# Patient Record
Sex: FEMALE | Race: OTHER | NOT HISPANIC OR LATINO | URBAN - METROPOLITAN AREA
[De-identification: names, ages, dates, MRNs, and addresses within clinical notes are randomized per-mention and may not be internally consistent; named-entity substitution may affect disease eponyms.]

---

## 2017-01-13 ENCOUNTER — EMERGENCY (EMERGENCY)
Facility: HOSPITAL | Age: 52
LOS: 1 days | Discharge: HOME | End: 2017-01-13
Attending: EMERGENCY MEDICINE | Admitting: EMERGENCY MEDICINE
Payer: COMMERCIAL

## 2017-01-13 VITALS
HEART RATE: 96 BPM | OXYGEN SATURATION: 99 % | SYSTOLIC BLOOD PRESSURE: 114 MMHG | RESPIRATION RATE: 16 BRPM | DIASTOLIC BLOOD PRESSURE: 80 MMHG | TEMPERATURE: 97 F

## 2017-01-13 DIAGNOSIS — R22.0 LOCALIZED SWELLING, MASS AND LUMP, HEAD: ICD-10-CM

## 2017-01-13 DIAGNOSIS — L03.211 CELLULITIS OF FACE: ICD-10-CM

## 2017-01-13 DIAGNOSIS — Z91.013 ALLERGY TO SEAFOOD: ICD-10-CM

## 2017-01-13 DIAGNOSIS — Z79.2 LONG TERM (CURRENT) USE OF ANTIBIOTICS: ICD-10-CM

## 2017-01-13 DIAGNOSIS — Z88.0 ALLERGY STATUS TO PENICILLIN: ICD-10-CM

## 2017-01-13 LAB
ALBUMIN SERPL ELPH-MCNC: 3.7 G/DL — SIGNIFICANT CHANGE UP (ref 3.4–5)
ALP SERPL-CCNC: 68 U/L — SIGNIFICANT CHANGE UP (ref 40–120)
ALT FLD-CCNC: 9 U/L — LOW (ref 12–42)
ANION GAP SERPL CALC-SCNC: 9 MMOL/L — SIGNIFICANT CHANGE UP (ref 9–16)
APTT BLD: 31.5 SEC — SIGNIFICANT CHANGE UP (ref 27.5–36.5)
AST SERPL-CCNC: 31 U/L — SIGNIFICANT CHANGE UP (ref 15–37)
BILIRUB SERPL-MCNC: 0.5 MG/DL — SIGNIFICANT CHANGE UP (ref 0.2–1.2)
BUN SERPL-MCNC: 13 MG/DL — SIGNIFICANT CHANGE UP (ref 7–23)
CALCIUM SERPL-MCNC: 9 MG/DL — SIGNIFICANT CHANGE UP (ref 8.5–10.5)
CHLORIDE SERPL-SCNC: 103 MMOL/L — SIGNIFICANT CHANGE UP (ref 96–108)
CO2 SERPL-SCNC: 26 MMOL/L — SIGNIFICANT CHANGE UP (ref 22–31)
CREAT SERPL-MCNC: 0.65 MG/DL — SIGNIFICANT CHANGE UP (ref 0.5–1.3)
GLUCOSE SERPL-MCNC: 88 MG/DL — SIGNIFICANT CHANGE UP (ref 70–99)
HCG SERPL-ACNC: 3 MIU/ML — SIGNIFICANT CHANGE UP
HCT VFR BLD CALC: 40.6 % — SIGNIFICANT CHANGE UP (ref 34.5–45)
HGB BLD-MCNC: 14 G/DL — SIGNIFICANT CHANGE UP (ref 11.5–15.5)
INR BLD: 1.04 — SIGNIFICANT CHANGE UP (ref 0.88–1.16)
LACTATE SERPL-SCNC: 0.9 MMOL/L — SIGNIFICANT CHANGE UP (ref 0.4–2)
MCHC RBC-ENTMCNC: 31.5 PG — SIGNIFICANT CHANGE UP (ref 27–34)
MCHC RBC-ENTMCNC: 34.5 G/DL — SIGNIFICANT CHANGE UP (ref 32–36)
MCV RBC AUTO: 91.2 FL — SIGNIFICANT CHANGE UP (ref 80–100)
PLATELET # BLD AUTO: 199 K/UL — SIGNIFICANT CHANGE UP (ref 150–400)
POTASSIUM SERPL-MCNC: 5.1 MMOL/L — SIGNIFICANT CHANGE UP (ref 3.5–5.3)
POTASSIUM SERPL-SCNC: 5.1 MMOL/L — SIGNIFICANT CHANGE UP (ref 3.5–5.3)
PROT SERPL-MCNC: 7.6 G/DL — SIGNIFICANT CHANGE UP (ref 6.4–8.2)
PROTHROM AB SERPL-ACNC: 11.4 SEC — SIGNIFICANT CHANGE UP (ref 10–13.1)
RBC # BLD: 4.45 M/UL — SIGNIFICANT CHANGE UP (ref 3.8–5.2)
RBC # FLD: 11.9 % — SIGNIFICANT CHANGE UP (ref 10.3–16.9)
SODIUM SERPL-SCNC: 138 MMOL/L — SIGNIFICANT CHANGE UP (ref 132–145)
WBC # BLD: 13.6 K/UL — HIGH (ref 3.8–10.5)
WBC # FLD AUTO: 13.6 K/UL — HIGH (ref 3.8–10.5)

## 2017-01-13 PROCEDURE — 70487 CT MAXILLOFACIAL W/DYE: CPT | Mod: 26

## 2017-01-13 PROCEDURE — 99284 EMERGENCY DEPT VISIT MOD MDM: CPT

## 2017-01-13 RX ORDER — SODIUM CHLORIDE 9 MG/ML
1000 INJECTION INTRAMUSCULAR; INTRAVENOUS; SUBCUTANEOUS ONCE
Qty: 0 | Refills: 0 | Status: COMPLETED | OUTPATIENT
Start: 2017-01-13 | End: 2017-01-13

## 2017-01-13 RX ORDER — CEPHALEXIN 500 MG
1 CAPSULE ORAL
Qty: 40 | Refills: 0 | OUTPATIENT
Start: 2017-01-13 | End: 2017-01-23

## 2017-01-13 RX ADMIN — Medication 100 MILLIGRAM(S): at 18:05

## 2017-01-13 RX ADMIN — SODIUM CHLORIDE 1000 MILLILITER(S): 9 INJECTION INTRAMUSCULAR; INTRAVENOUS; SUBCUTANEOUS at 18:05

## 2017-01-13 NOTE — ED PROVIDER NOTE - OBJECTIVE STATEMENT
50 yo F w/ no pertinent PMHx c/o right cheek swelling s/p liposuction about x1 week ago. Pt states she had an cosmetic procedure last Thursday in [place], liposuction of both cheeks. Notes that she took abx and on Monday, and felt a small bump on her right cheek. Got put on new abx, Keflex, and overnight her bump got much bigger.

## 2017-01-13 NOTE — ED PROVIDER NOTE - MEDICAL DECISION MAKING DETAILS
spoke with Dr Stringer of plastics, Rx clinda 300 q 8 h in addition to Keflex and he can fokllow up in office, small collections not large enough to incise on CT scan

## 2017-01-13 NOTE — ED ADULT TRIAGE NOTE - CHIEF COMPLAINT QUOTE
sent from urgent care for right facial swelling s/p liposuction about 1 week ago, now on antibiotic, keflex

## 2017-01-13 NOTE — ED PROVIDER NOTE - NS ED MD SCRIBE ATTENDING SCRIBE SECTIONS
PAST MEDICAL/SURGICAL/SOCIAL HISTORY/DISPOSITION/OBSERVATION MONITORING PLAN/PHYSICAL EXAM/REVIEW OF SYSTEMS/HISTORY OF PRESENT ILLNESS

## 2017-01-14 ENCOUNTER — INPATIENT (INPATIENT)
Facility: HOSPITAL | Age: 52
LOS: 1 days | Discharge: HOME | DRG: 863 | End: 2017-01-16
Attending: STUDENT IN AN ORGANIZED HEALTH CARE EDUCATION/TRAINING PROGRAM | Admitting: STUDENT IN AN ORGANIZED HEALTH CARE EDUCATION/TRAINING PROGRAM
Payer: COMMERCIAL

## 2017-01-14 VITALS
TEMPERATURE: 99 F | OXYGEN SATURATION: 96 % | WEIGHT: 125 LBS | HEART RATE: 96 BPM | SYSTOLIC BLOOD PRESSURE: 100 MMHG | HEIGHT: 67 IN | DIASTOLIC BLOOD PRESSURE: 69 MMHG | RESPIRATION RATE: 18 BRPM

## 2017-01-14 LAB
ALBUMIN SERPL ELPH-MCNC: 3.3 G/DL — LOW (ref 3.4–5)
ALP SERPL-CCNC: 59 U/L — SIGNIFICANT CHANGE UP (ref 40–120)
ALT FLD-CCNC: 23 U/L — SIGNIFICANT CHANGE UP (ref 12–42)
ANION GAP SERPL CALC-SCNC: 7 MMOL/L — LOW (ref 9–16)
AST SERPL-CCNC: 15 U/L — SIGNIFICANT CHANGE UP (ref 15–37)
BASOPHILS NFR BLD AUTO: 0.2 % — SIGNIFICANT CHANGE UP (ref 0–2)
BILIRUB SERPL-MCNC: 0.4 MG/DL — SIGNIFICANT CHANGE UP (ref 0.2–1.2)
BUN SERPL-MCNC: 7 MG/DL — SIGNIFICANT CHANGE UP (ref 7–23)
CALCIUM SERPL-MCNC: 9.1 MG/DL — SIGNIFICANT CHANGE UP (ref 8.5–10.5)
CHLORIDE SERPL-SCNC: 103 MMOL/L — SIGNIFICANT CHANGE UP (ref 96–108)
CO2 SERPL-SCNC: 28 MMOL/L — SIGNIFICANT CHANGE UP (ref 22–31)
CREAT SERPL-MCNC: 0.72 MG/DL — SIGNIFICANT CHANGE UP (ref 0.5–1.3)
EOSINOPHIL NFR BLD AUTO: 1.4 % — SIGNIFICANT CHANGE UP (ref 0–6)
GLUCOSE SERPL-MCNC: 93 MG/DL — SIGNIFICANT CHANGE UP (ref 70–99)
HCT VFR BLD CALC: 37.8 % — SIGNIFICANT CHANGE UP (ref 34.5–45)
HGB BLD-MCNC: 13 G/DL — SIGNIFICANT CHANGE UP (ref 11.5–15.5)
IMM GRANULOCYTES NFR BLD AUTO: 0.4 % — SIGNIFICANT CHANGE UP (ref 0–1.5)
LACTATE SERPL-SCNC: 0.8 MMOL/L — SIGNIFICANT CHANGE UP (ref 0.4–2)
LYMPHOCYTES # BLD AUTO: 13.3 % — SIGNIFICANT CHANGE UP (ref 13–44)
MCHC RBC-ENTMCNC: 32.2 PG — SIGNIFICANT CHANGE UP (ref 27–34)
MCHC RBC-ENTMCNC: 34.4 G/DL — SIGNIFICANT CHANGE UP (ref 32–36)
MCV RBC AUTO: 93.6 FL — SIGNIFICANT CHANGE UP (ref 80–100)
MONOCYTES NFR BLD AUTO: 8.4 % — SIGNIFICANT CHANGE UP (ref 2–14)
NEUTROPHILS NFR BLD AUTO: 76.3 % — SIGNIFICANT CHANGE UP (ref 43–77)
PLATELET # BLD AUTO: 182 K/UL — SIGNIFICANT CHANGE UP (ref 150–400)
POTASSIUM SERPL-MCNC: 4.2 MMOL/L — SIGNIFICANT CHANGE UP (ref 3.5–5.3)
POTASSIUM SERPL-SCNC: 4.2 MMOL/L — SIGNIFICANT CHANGE UP (ref 3.5–5.3)
PROT SERPL-MCNC: 7 G/DL — SIGNIFICANT CHANGE UP (ref 6.4–8.2)
RBC # BLD: 4.04 M/UL — SIGNIFICANT CHANGE UP (ref 3.8–5.2)
RBC # FLD: 12 % — SIGNIFICANT CHANGE UP (ref 10.3–16.9)
SODIUM SERPL-SCNC: 138 MMOL/L — SIGNIFICANT CHANGE UP (ref 132–145)
WBC # BLD: 11.8 K/UL — HIGH (ref 3.8–10.5)
WBC # FLD AUTO: 11.8 K/UL — HIGH (ref 3.8–10.5)

## 2017-01-14 PROCEDURE — 99220: CPT

## 2017-01-14 PROCEDURE — 70487 CT MAXILLOFACIAL W/DYE: CPT | Mod: 26

## 2017-01-14 RX ORDER — VANCOMYCIN HCL 1 G
VIAL (EA) INTRAVENOUS
Qty: 0 | Refills: 0 | Status: DISCONTINUED | OUTPATIENT
Start: 2017-01-14 | End: 2017-01-15

## 2017-01-14 RX ORDER — MORPHINE SULFATE 50 MG/1
2 CAPSULE, EXTENDED RELEASE ORAL ONCE
Qty: 0 | Refills: 0 | Status: DISCONTINUED | OUTPATIENT
Start: 2017-01-14 | End: 2017-01-15

## 2017-01-14 RX ORDER — VANCOMYCIN HCL 1 G
1000 VIAL (EA) INTRAVENOUS EVERY 12 HOURS
Qty: 0 | Refills: 0 | Status: DISCONTINUED | OUTPATIENT
Start: 2017-01-15 | End: 2017-01-15

## 2017-01-14 RX ORDER — VANCOMYCIN HCL 1 G
1000 VIAL (EA) INTRAVENOUS ONCE
Qty: 0 | Refills: 0 | Status: COMPLETED | OUTPATIENT
Start: 2017-01-14 | End: 2017-01-14

## 2017-01-14 RX ORDER — SODIUM CHLORIDE 9 MG/ML
3 INJECTION INTRAMUSCULAR; INTRAVENOUS; SUBCUTANEOUS ONCE
Qty: 0 | Refills: 0 | Status: COMPLETED | OUTPATIENT
Start: 2017-01-14 | End: 2017-01-14

## 2017-01-14 RX ORDER — SODIUM CHLORIDE 9 MG/ML
1000 INJECTION INTRAMUSCULAR; INTRAVENOUS; SUBCUTANEOUS
Qty: 0 | Refills: 0 | Status: DISCONTINUED | OUTPATIENT
Start: 2017-01-14 | End: 2017-01-15

## 2017-01-14 RX ORDER — VANCOMYCIN HCL 1 G
1000 VIAL (EA) INTRAVENOUS EVERY 12 HOURS
Qty: 0 | Refills: 0 | Status: DISCONTINUED | OUTPATIENT
Start: 2017-01-14 | End: 2017-01-14

## 2017-01-14 RX ADMIN — SODIUM CHLORIDE 125 MILLILITER(S): 9 INJECTION INTRAMUSCULAR; INTRAVENOUS; SUBCUTANEOUS at 17:21

## 2017-01-14 RX ADMIN — SODIUM CHLORIDE 125 MILLILITER(S): 9 INJECTION INTRAMUSCULAR; INTRAVENOUS; SUBCUTANEOUS at 18:15

## 2017-01-14 RX ADMIN — Medication 250 MILLIGRAM(S): at 20:00

## 2017-01-14 RX ADMIN — SODIUM CHLORIDE 3 MILLILITER(S): 9 INJECTION INTRAMUSCULAR; INTRAVENOUS; SUBCUTANEOUS at 17:20

## 2017-01-14 RX ADMIN — Medication 100 MILLIGRAM(S): at 17:20

## 2017-01-14 NOTE — ED CDU PROVIDER NOTE - PROGRESS NOTE DETAILS
endorsed to dr nunes at 8pm   the patient is comfortable in bed and verbally expresses understanding / rationale for obs status. Patient seen in ED by Dr. Hargrove (plastics) who did a small I&D of the affected area with small amount of drainage.  He agrees that patient should be admitted for continued IV antibiotics and he will follow there.  Patient agrees to admission.  Case d/w transfer center, Dr. Fagan (hospitalist) and Dr. Ortez ([facility]).

## 2017-01-14 NOTE — ED CDU PROVIDER NOTE - MEDICAL DECISION MAKING DETAILS
Facial cellulitis after liposuction procedure.  No evidence of sepsis, no concern for ocular or oral involvement.  Will admit for continued IV antibiotics and close observation.

## 2017-01-14 NOTE — ED PROVIDER NOTE - OBJECTIVE STATEMENT
50 yo F with no pertinent hx returns to the ED for another course of IV abx as per doctor in [place]. Pt states that she had liposuction on bilateral cheeks done on 01/05/2017 in [place]. Was given 3 day course of antibiotics. Pt states she took some of the antibiotics, however took herself off of them. On 01/09/2017, pt noticed a lump on the lower right part of her cheek and was put on azithromycin. Pt states that 2 days later she was then put on Ceftriaxone. On 01/12/15, pt woke up with lump getting bigger and continued to worsen until the next day. Has been in contact with her [place] doctor who instructed her to go the ED. Pt came to the ED yesterday was given IV abx and CT scan done. Was discharged with Keflex and Clindamycin. Was told by ED physician to come back if symptom continues to worsen. Woke up today with slightly more swelling and pain. Spoke to her [place] doctor who told her to go back to the ED for another course of IV abx. Denies fever or chills. 50 yo F returns to [facility] for slowly progressive cellulitis / swelling and redness to the right side of her face over the last 24 hours - the patient was treated at [facility] yesterday for facial cellultis (chart reviewed) - she  given clindamycin and keflex.  signs and symptoms of cellultis started on 1/9/17 4 days s/p liposuction procedure to face/cheek area on 1/5/17. 50 yo F returns to [facility] for slowly progressive cellulitis / swelling and redness to the right side of her face over the last 24 hours - the patient was treated at [facility] yesterday for facial cellulitis (chart reviewed) - she  given clindamycin and keflex.  signs and symptoms of cellulitis started on 1/9/17 4 days s/p liposuction procedure to face/cheek area on 1/5/17.

## 2017-01-14 NOTE — ED PROVIDER NOTE - SKIN, MLM
Right side of infra orbital to right side of mandible is swollen, tender, and erythematous. Increased warmth with induration.

## 2017-01-14 NOTE — ED CDU PROVIDER NOTE - OBJECTIVE STATEMENT
52 yo F returns to [facility] for slowly progressive cellulitis / swelling and redness to the right side of her face over the last 24 hours - the patient was treated at [facility] yesterday for facial cellultis (chart reviewed) - she  given clindamycin and keflex.  signs and symptoms of cellultis started on 1/9/17 4 days s/p liposuction procedure to face/cheek area on 1/5/17.

## 2017-01-14 NOTE — ED PROVIDER NOTE - MEDICAL DECISION MAKING DETAILS
I spoke with the patient regarding the unchanged labs and ct findings since yesterday. plan to admit to observation for several rounds of iv abx. I consulted dr peñaloza (who was consulted yesterday by dr nunes) who will see the patient during her obs course. clindaymicin and vanco rx

## 2017-01-14 NOTE — ED PROVIDER NOTE - NS ED MD SCRIBE ATTENDING SCRIBE SECTIONS
DISPOSITION/HIV/VITAL SIGNS( Pullset)/HISTORY OF PRESENT ILLNESS/PAST MEDICAL/SURGICAL/SOCIAL HISTORY/REVIEW OF SYSTEMS/PHYSICAL EXAM

## 2017-01-15 DIAGNOSIS — R63.8 OTHER SYMPTOMS AND SIGNS CONCERNING FOOD AND FLUID INTAKE: ICD-10-CM

## 2017-01-15 DIAGNOSIS — Z98.890 OTHER SPECIFIED POSTPROCEDURAL STATES: Chronic | ICD-10-CM

## 2017-01-15 DIAGNOSIS — Z41.1 ENCOUNTER FOR COSMETIC SURGERY: Chronic | ICD-10-CM

## 2017-01-15 DIAGNOSIS — Z41.8 ENCOUNTER FOR OTHER PROCEDURES FOR PURPOSES OTHER THAN REMEDYING HEALTH STATE: ICD-10-CM

## 2017-01-15 DIAGNOSIS — L03.211 CELLULITIS OF FACE: ICD-10-CM

## 2017-01-15 PROCEDURE — 99217: CPT

## 2017-01-15 PROCEDURE — 99222 1ST HOSP IP/OBS MODERATE 55: CPT | Mod: GC

## 2017-01-15 RX ORDER — SODIUM CHLORIDE 9 MG/ML
1000 INJECTION INTRAMUSCULAR; INTRAVENOUS; SUBCUTANEOUS
Qty: 0 | Refills: 0 | Status: DISCONTINUED | OUTPATIENT
Start: 2017-01-15 | End: 2017-01-16

## 2017-01-15 RX ORDER — MORPHINE SULFATE 50 MG/1
2 CAPSULE, EXTENDED RELEASE ORAL ONCE
Qty: 0 | Refills: 0 | Status: DISCONTINUED | OUTPATIENT
Start: 2017-01-15 | End: 2017-01-15

## 2017-01-15 RX ORDER — INFLUENZA VIRUS VACCINE 15; 15; 15; 15 UG/.5ML; UG/.5ML; UG/.5ML; UG/.5ML
0.5 SUSPENSION INTRAMUSCULAR ONCE
Qty: 0 | Refills: 0 | Status: COMPLETED | OUTPATIENT
Start: 2017-01-15 | End: 2017-01-16

## 2017-01-15 RX ORDER — HEPARIN SODIUM 5000 [USP'U]/ML
5000 INJECTION INTRAVENOUS; SUBCUTANEOUS EVERY 8 HOURS
Qty: 0 | Refills: 0 | Status: DISCONTINUED | OUTPATIENT
Start: 2017-01-15 | End: 2017-01-16

## 2017-01-15 RX ORDER — ACETAMINOPHEN 500 MG
650 TABLET ORAL EVERY 6 HOURS
Qty: 0 | Refills: 0 | Status: DISCONTINUED | OUTPATIENT
Start: 2017-01-15 | End: 2017-01-16

## 2017-01-15 RX ORDER — SODIUM CHLORIDE 9 MG/ML
1000 INJECTION INTRAMUSCULAR; INTRAVENOUS; SUBCUTANEOUS ONCE
Qty: 0 | Refills: 0 | Status: COMPLETED | OUTPATIENT
Start: 2017-01-15 | End: 2017-01-15

## 2017-01-15 RX ORDER — VANCOMYCIN HCL 1 G
1000 VIAL (EA) INTRAVENOUS EVERY 12 HOURS
Qty: 0 | Refills: 0 | Status: DISCONTINUED | OUTPATIENT
Start: 2017-01-15 | End: 2017-01-16

## 2017-01-15 RX ADMIN — SODIUM CHLORIDE 1000 MILLILITER(S): 9 INJECTION INTRAMUSCULAR; INTRAVENOUS; SUBCUTANEOUS at 07:09

## 2017-01-15 RX ADMIN — MORPHINE SULFATE 2 MILLIGRAM(S): 50 CAPSULE, EXTENDED RELEASE ORAL at 00:20

## 2017-01-15 RX ADMIN — MORPHINE SULFATE 2 MILLIGRAM(S): 50 CAPSULE, EXTENDED RELEASE ORAL at 00:09

## 2017-01-15 RX ADMIN — MORPHINE SULFATE 2 MILLIGRAM(S): 50 CAPSULE, EXTENDED RELEASE ORAL at 16:31

## 2017-01-15 RX ADMIN — Medication 650 MILLIGRAM(S): at 22:01

## 2017-01-15 RX ADMIN — Medication 100 MILLIGRAM(S): at 09:34

## 2017-01-15 RX ADMIN — Medication 650 MILLIGRAM(S): at 02:17

## 2017-01-15 RX ADMIN — Medication 100 MILLIGRAM(S): at 00:09

## 2017-01-15 RX ADMIN — MORPHINE SULFATE 2 MILLIGRAM(S): 50 CAPSULE, EXTENDED RELEASE ORAL at 16:45

## 2017-01-15 RX ADMIN — Medication 166.67 MILLIGRAM(S): at 21:51

## 2017-01-15 RX ADMIN — Medication 250 MILLIGRAM(S): at 09:34

## 2017-01-15 RX ADMIN — SODIUM CHLORIDE 125 MILLILITER(S): 9 INJECTION INTRAMUSCULAR; INTRAVENOUS; SUBCUTANEOUS at 09:00

## 2017-01-15 NOTE — H&P ADULT. - PROBLEM SELECTOR PLAN 1
-pt failed outpt keflex  -will dose clindamycin IV   -will f/u blood and wound cultures-wound drained by plastics- Dr Stringer-appreciate recs  -will monitor response

## 2017-01-15 NOTE — H&P ADULT. - HISTORY OF PRESENT ILLNESS
Pt is a healthy 51 year old F who presented to [facility] for the second time this week with facial swelling and redness. On 1/5/17 she has an outpt liposuction procedure performed. She was given erythromycin to take after the procedure-she completed 4.5 days out of a 7 day course. She noted a small bump a week or so after the procedure and was perscirbed keflex which she has been taking since Thursday. She came in yesterday bc the swelling was getting worse. She has no other symptoms including fevers (although she had one temp in the ER), chills, n/v/d, visual changes, trouble swallowing or headaches.

## 2017-01-15 NOTE — H&P ADULT. - ATTENDING COMMENTS
50 y/o F c/o R facial redness, swelling, warmth, and pain s/p outpatient liposuction procedure on 1/5.  Sx began as a small bump and progressed despite rx w/ Keflex.  ROS otherwise negative; she denies F/C, HA, N/V, vision changes.  VS 97.6 107/75 83 14 96% RA.  Exam as per PGY-3; Pt. A&Ox3 in NAD, non-toxic appearing, MMM, R face w/ erythema and edema w/ packing.  Labs reviewed; WBC 11.8, BCx NGTD.  Images reviewed.  (1) Purulent face cellulitis + abscess -- failed outpatient rx; s/p I&D in ER by Plastics; cont. IV Vanc pending cultures (but can likely d/c on Bactrim or Doxy; needs MRSA coverage); supportive care  (2) Dispo -- admit to F

## 2017-01-15 NOTE — H&P ADULT. - SKIN
detailed exam face: right sided area of erythema ([id number]) with area of laceration with packing in place, area mildly tender, no fluctuance

## 2017-01-16 VITALS
OXYGEN SATURATION: 96 % | RESPIRATION RATE: 16 BRPM | HEART RATE: 77 BPM | TEMPERATURE: 98 F | SYSTOLIC BLOOD PRESSURE: 115 MMHG

## 2017-01-16 LAB
ANION GAP SERPL CALC-SCNC: 10 MMOL/L — SIGNIFICANT CHANGE UP (ref 9–16)
BUN SERPL-MCNC: 4 MG/DL — LOW (ref 7–23)
CALCIUM SERPL-MCNC: 8.2 MG/DL — LOW (ref 8.5–10.5)
CHLORIDE SERPL-SCNC: 112 MMOL/L — HIGH (ref 96–108)
CO2 SERPL-SCNC: 21 MMOL/L — LOW (ref 22–31)
CREAT SERPL-MCNC: 0.55 MG/DL — SIGNIFICANT CHANGE UP (ref 0.5–1.3)
GLUCOSE SERPL-MCNC: 82 MG/DL — SIGNIFICANT CHANGE UP (ref 70–99)
HCT VFR BLD CALC: 36.5 % — SIGNIFICANT CHANGE UP (ref 34.5–45)
HGB BLD-MCNC: 12 G/DL — SIGNIFICANT CHANGE UP (ref 11.5–15.5)
MAGNESIUM SERPL-MCNC: 2.1 MG/DL — SIGNIFICANT CHANGE UP (ref 1.6–2.4)
MCHC RBC-ENTMCNC: 30.3 PG — SIGNIFICANT CHANGE UP (ref 27–34)
MCHC RBC-ENTMCNC: 32.9 G/DL — SIGNIFICANT CHANGE UP (ref 32–36)
MCV RBC AUTO: 92.2 FL — SIGNIFICANT CHANGE UP (ref 80–100)
PLATELET # BLD AUTO: 192 K/UL — SIGNIFICANT CHANGE UP (ref 150–400)
POTASSIUM SERPL-MCNC: 3.9 MMOL/L — SIGNIFICANT CHANGE UP (ref 3.5–5.3)
POTASSIUM SERPL-SCNC: 3.9 MMOL/L — SIGNIFICANT CHANGE UP (ref 3.5–5.3)
RBC # BLD: 3.96 M/UL — SIGNIFICANT CHANGE UP (ref 3.8–5.2)
RBC # FLD: 12 % — SIGNIFICANT CHANGE UP (ref 10.3–16.9)
SODIUM SERPL-SCNC: 143 MMOL/L — SIGNIFICANT CHANGE UP (ref 135–145)
VANCOMYCIN TROUGH SERPL-MCNC: 9 UG/ML — LOW (ref 10–20)
WBC # BLD: 5.3 K/UL — SIGNIFICANT CHANGE UP (ref 3.8–10.5)
WBC # FLD AUTO: 5.3 K/UL — SIGNIFICANT CHANGE UP (ref 3.8–10.5)

## 2017-01-16 PROCEDURE — 36415 COLL VENOUS BLD VENIPUNCTURE: CPT

## 2017-01-16 PROCEDURE — 85027 COMPLETE CBC AUTOMATED: CPT

## 2017-01-16 PROCEDURE — 90686 IIV4 VACC NO PRSV 0.5 ML IM: CPT

## 2017-01-16 PROCEDURE — 80048 BASIC METABOLIC PNL TOTAL CA: CPT

## 2017-01-16 PROCEDURE — 85025 COMPLETE CBC W/AUTO DIFF WBC: CPT

## 2017-01-16 PROCEDURE — 96376 TX/PRO/DX INJ SAME DRUG ADON: CPT

## 2017-01-16 PROCEDURE — 83735 ASSAY OF MAGNESIUM: CPT

## 2017-01-16 PROCEDURE — 96375 TX/PRO/DX INJ NEW DRUG ADDON: CPT | Mod: XU

## 2017-01-16 PROCEDURE — 99285 EMERGENCY DEPT VISIT HI MDM: CPT | Mod: 25

## 2017-01-16 PROCEDURE — 96374 THER/PROPH/DIAG INJ IV PUSH: CPT | Mod: XU

## 2017-01-16 PROCEDURE — 87070 CULTURE OTHR SPECIMN AEROBIC: CPT

## 2017-01-16 PROCEDURE — 80202 ASSAY OF VANCOMYCIN: CPT

## 2017-01-16 PROCEDURE — 99238 HOSP IP/OBS DSCHRG MGMT 30/<: CPT

## 2017-01-16 PROCEDURE — 70487 CT MAXILLOFACIAL W/DYE: CPT

## 2017-01-16 PROCEDURE — 83605 ASSAY OF LACTIC ACID: CPT

## 2017-01-16 PROCEDURE — G0378: CPT

## 2017-01-16 PROCEDURE — 80053 COMPREHEN METABOLIC PANEL: CPT

## 2017-01-16 PROCEDURE — 87205 SMEAR GRAM STAIN: CPT

## 2017-01-16 RX ORDER — AZTREONAM 2 G
1 VIAL (EA) INJECTION
Qty: 20 | Refills: 0 | OUTPATIENT
Start: 2017-01-16 | End: 2017-01-26

## 2017-01-16 RX ORDER — ACETAMINOPHEN 500 MG
2 TABLET ORAL
Qty: 24 | Refills: 0 | OUTPATIENT
Start: 2017-01-16 | End: 2017-01-19

## 2017-01-16 RX ORDER — ACETAMINOPHEN 500 MG
650 TABLET ORAL EVERY 6 HOURS
Qty: 0 | Refills: 0 | Status: DISCONTINUED | OUTPATIENT
Start: 2017-01-16 | End: 2017-01-16

## 2017-01-16 RX ORDER — CEPHALEXIN 500 MG
1 CAPSULE ORAL
Qty: 0 | Refills: 0 | COMMUNITY

## 2017-01-16 RX ADMIN — Medication 166.67 MILLIGRAM(S): at 12:11

## 2017-01-16 RX ADMIN — Medication 650 MILLIGRAM(S): at 12:50

## 2017-01-16 RX ADMIN — Medication 650 MILLIGRAM(S): at 14:00

## 2017-01-16 RX ADMIN — INFLUENZA VIRUS VACCINE 0.5 MILLILITER(S): 15; 15; 15; 15 SUSPENSION INTRAMUSCULAR at 13:40

## 2017-01-16 NOTE — DISCHARGE NOTE ADULT - PLAN OF CARE
To treat your skin infection You came to the hospital with a skin infection on your face after a surgical procedure. You had taken an antibiotic called keflex that did not reduce the infection. In the hospital we treated you with an antibiotic called Vancomycin to treat the likely bacterial cause of your infection called Staph Aureus. Your symptoms improved. We also had a plastic surgeon see you. We are sending you home with a prescription for an oral antibiotic called bactrim that you will take for 10 more days. You should also take regular showers and gently apply pressure to the facial wound to help keep the inside of the wound clean. You should call the plastic surgeon Dr. Stringer to make an appointment to see him this week for follow up. If you symptoms get worse (e.g. worse swelling, pain) then you should see him sooner or go to an emergency room.

## 2017-01-16 NOTE — PROGRESS NOTE ADULT - PROBLEM SELECTOR PLAN 1
c/b abscess, s/p I&D in ER by Plastics; clinically much improved; can change abx to P.O. Bactrim x7 more days; f/u cx's (NGTD), remove packing today; outpatient Plastics f/u on Wednesday

## 2017-01-16 NOTE — DISCHARGE NOTE ADULT - MEDICATION SUMMARY - MEDICATIONS TO TAKE
I will START or STAY ON the medications listed below when I get home from the hospital:    acetaminophen 325 mg oral tablet  -- 2 tab(s) by mouth every 6 hours, As needed, For pain  -- Indication: For pain     Bactrim  mg-160 mg oral tablet  -- 1 tab(s) by mouth 2 times a day  -- Avoid prolonged or excessive exposure to direct and/or artificial sunlight while taking this medication.  Finish all this medication unless otherwise directed by prescriber.  Medication should be taken with plenty of water.    -- Indication: For facial infection I will START or STAY ON the medications listed below when I get home from the hospital:    acetaminophen 325 mg oral tablet  -- 2 tab(s) by mouth every 6 hours, As needed, For pain  -- Indication: For pain    Bactrim  mg-160 mg oral tablet  -- 1 tab(s) by mouth 2 times a day  -- Avoid prolonged or excessive exposure to direct and/or artificial sunlight while taking this medication.  Finish all this medication unless otherwise directed by prescriber.  Medication should be taken with plenty of water.    -- Indication: For skin infection

## 2017-01-16 NOTE — DISCHARGE NOTE ADULT - CARE PROVIDER_API CALL
Lopez Stringer), Plastic Surgery  [address] [place] [address], [place] [facility] [place]  Phone: [phone]  Fax: [phone]

## 2017-01-16 NOTE — DISCHARGE NOTE ADULT - MEDICATION SUMMARY - MEDICATIONS TO STOP TAKING
I will STOP taking the medications listed below when I get home from the hospital:    Keflex 250 mg oral capsule  -- 1 cap(s) by mouth 4 times a day    Cleocin HCl 300 mg oral capsule  -- 1 cap(s) by mouth 3 times a day  -- Finish all this medication unless otherwise directed by prescriber.  Medication should be taken with plenty of water.

## 2017-01-16 NOTE — DISCHARGE NOTE ADULT - PATIENT PORTAL LINK FT
“You can access the Follow[employer] Patient Portal, offered by [facility], by registering with the following website: [email or web address]”

## 2017-01-16 NOTE — DISCHARGE NOTE ADULT - INSTRUCTIONS
continue care of our surgical wound. report to MD or go to emergency room if you have fever, pain not relieved with prescribed medication, bleeding, increase in swelling or discharge coming form incision site

## 2017-01-16 NOTE — DISCHARGE NOTE ADULT - NS AS DC FOLLOWUP STROKE INST
[facility] Online Patient Education: Surgical Wound Discharge Instructions [employer] Online Patient Education: Surgical Wound Discharge Instructions/Influenza vaccination (VIS Pub Date: August 19, 2014)

## 2017-01-16 NOTE — DISCHARGE NOTE ADULT - HOSPITAL COURSE
Ms. Tejeda is a 51 year old F no significant PMH presented to [facility] for the second time this week with facial swelling and redness. On 1/5/17 she has an outpt facial liposuction procedure performed. She was given erythromycin to take after the procedure-she completed 4.5 days out of a 7 day course. She noted a small fascial bump a week after the procedure and went to the ED and was prescribed keflex. She returned to the ED bc the swelling was getting worse. In the ED (1/15), pt was febrile 102 with leukocytosis. BCx NGTD. Pt was noted to have a facial abscess. No lab abnormalities. CT showed R cheek cellulitis. Plastics was consulted and the abscess was drained at bedside. Pt was started on vancomycin for MRSA coverage. Wound Cx NGTD. She clinically improved s/p I&D and abx (afebrile, downtrending white count, reduced facial swelling). She is being discharged on 1/16 in stable condition with Rx for bactrim for 10 more days and instructions to follow up with Dr. Stringer (Plastic Surgeon) within the week.

## 2017-01-16 NOTE — DISCHARGE NOTE ADULT - CONDITIONS AT DISCHARGE
continue care of your surgical wound as instructed, take medications as instructed stable, tolerated diet, supplies given for wound care home use (gauze and tape)

## 2017-01-16 NOTE — PROGRESS NOTE ADULT - SUBJECTIVE AND OBJECTIVE BOX
Patient is a 51y old  Female who presents with a chief complaint of face swelling (16 Jan 2017 12:12)      INTERVAL HPI/OVERNIGHT EVENTS:    Pt. feels much better; c/o decreased facial pain, swelling, and redness; no F/C or N/V    Review of Systems: 12 point review of systems otherwise negative    MEDICATIONS  (STANDING):  heparin  Injectable 5000Unit(s) SubCutaneous every 8 hours  vancomycin  IVPB 1000milliGRAM(s) IV Intermittent every 12 hours    MEDICATIONS  (PRN):  acetaminophen   Tablet 650milliGRAM(s) Oral every 6 hours PRN For Temp greater than 38 C (100.4 F)  acetaminophen   Tablet. 650milliGRAM(s) Oral every 6 hours PRN Moderate Pain (4 - 6)      Allergies    penicillin (Hives)  shellfish (Hives)    Intolerances          Vital Signs Last 24 Hrs  T(C): 36.7, Max: 37.1 (01-15 @ 20:20)  T(F): 98.1, Max: 98.7 (01-15 @ 20:20)  HR: 77 (65 - 77)  BP: 115/- (115/- - 161/85)  BP(mean): --  RR: 16 (16 - 17)  SpO2: 96% (95% - 99%)  CAPILLARY BLOOD GLUCOSE    I & Os for 24h ending 01-16 @ 07:00  =============================================  IN: 1000 ml / OUT: 1400 ml / NET: -400 ml    I & Os for current day (as of 01-16 @ 15:49)  =============================================  IN: 360 ml / OUT: 600 ml / NET: -240 ml      Physical Exam:    Daily     Daily   General:  Well appearing, NAD, non-toxic  HEENT:  MMM  Skin:  decreased R lower facial erythema and swelling; +packing  Neuro:  AAOx3    LABS:                        12.0   5.3   )-----------( 192      ( 16 Jan 2017 08:13 )             36.5     16 Jan 2017 08:13    143    |  112    |  4      ----------------------------<  82     3.9     |  21     |  0.55     Ca    8.2        16 Jan 2017 08:13  Mg     2.1       16 Jan 2017 08:13    TPro  7.0    /  Alb  3.3    /  TBili  0.4    /  DBili  x      /  AST  15     /  ALT  23     /  AlkPhos  59     14 Jan 2017 17:20            RADIOLOGY & ADDITIONAL TESTS:    ---------------------------------------------------------------------------  I personally reviewed: [  ]EKG   [  ]CXR    [  ] CT    [  ]Other  ---------------------------------------------------------------------------  PLEASE CHECK WHEN PRESENT:     [  ]Heart Failure     [  ] Acute     [  ] Acute on Chronic     [  ] Chronic  -------------------------------------------------------------------     [  ]Diastolic [HFpEF]     [  ]Systolic [HFrEF]     [  ]Combined [HFpEF & HFrEF]     [  ]Other:  -------------------------------------------------------------------  [  ]ELAINE     [  ]ATN     [  ]Reneal Medullary Necrosis     [  ]Renal Cortical Necrosis     [  ]Other Pathological Lesions:    [  ]CKD 1  [  ]CKD 2  [  ]CKD 3  [  ]CKD 4  [  ]CKD 5  [  ]Other  -------------------------------------------------------------------  [  ]Other/Unspecified:    --------------------------------------------------------------------    Abdominal Nutritional Status  [  ]Malnutrition: See Nutrition Note  [  ]Cachexia  [  ]Other:   [  ]Supplement Ordered:  [  ]Morbid Obesity (BMI >=40]

## 2017-01-16 NOTE — DISCHARGE NOTE ADULT - CARE PLAN
Principal Discharge DX:	Cellulitis of face  Goal:	To treat your skin infection  Instructions for follow-up, activity and diet:	You came to the hospital with a skin infection on your face after a surgical procedure. You had taken an antibiotic called keflex that did not reduce the infection. In the hospital we treated you with an antibiotic called Vancomycin to treat the likely bacterial cause of your infection called Staph Aureus. Your symptoms improved. We also had a plastic surgeon see you. We are sending you home with a prescription for an oral antibiotic called bactrim that you will take for 10 more days. You should also take regular showers and gently apply pressure to the facial wound to help keep the inside of the wound clean. You should call the plastic surgeon Dr. Stringer to make an appointment to see him this week for follow up. If you symptoms get worse (e.g. worse swelling, pain) then you should see him sooner or go to an emergency room.
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
## 2017-01-19 DIAGNOSIS — T81.4XXA INFECTION FOLLOWING A PROCEDURE, INITIAL ENCOUNTER: ICD-10-CM

## 2017-01-19 DIAGNOSIS — Z91.013 ALLERGY TO SEAFOOD: ICD-10-CM

## 2017-01-19 DIAGNOSIS — L03.211 CELLULITIS OF FACE: ICD-10-CM

## 2017-01-19 DIAGNOSIS — Z88.0 ALLERGY STATUS TO PENICILLIN: ICD-10-CM

## 2017-01-19 LAB
CULTURE RESULTS: SIGNIFICANT CHANGE UP
CULTURE RESULTS: SIGNIFICANT CHANGE UP
SPECIMEN SOURCE: SIGNIFICANT CHANGE UP
SPECIMEN SOURCE: SIGNIFICANT CHANGE UP

## 2017-01-20 LAB
CULTURE RESULTS: SIGNIFICANT CHANGE UP
SPECIMEN SOURCE: SIGNIFICANT CHANGE UP

## 2017-03-28 NOTE — DISCHARGE NOTE ADULT - CARE PROVIDERS DIRECT ADDRESSES
Pre-Visit Chart Review  For Appointment Scheduled on 4/29/17    Health Maintenance Due   Topic Date Due    Zoster Vaccine  [birth year]08[birth year]1992                     ,DirectAddress_Unknown,DirectAddress_Unknown

## 2022-01-07 NOTE — H&P ADULT. - PROBLEM SELECTOR PROBLEM 2
Chief Complaint   Patient presents with     Follow Up     B/P check   Roxie Griffith LPN   Need for prophylactic measure
